# Patient Record
Sex: MALE | Race: WHITE | NOT HISPANIC OR LATINO | URBAN - NONMETROPOLITAN AREA
[De-identification: names, ages, dates, MRNs, and addresses within clinical notes are randomized per-mention and may not be internally consistent; named-entity substitution may affect disease eponyms.]

---

## 2024-12-04 ENCOUNTER — APPOINTMENT (OUTPATIENT)
Dept: PRIMARY CARE | Facility: CLINIC | Age: 59
End: 2024-12-04
Payer: COMMERCIAL

## 2024-12-04 VITALS
DIASTOLIC BLOOD PRESSURE: 92 MMHG | SYSTOLIC BLOOD PRESSURE: 147 MMHG | WEIGHT: 203 LBS | HEIGHT: 73 IN | OXYGEN SATURATION: 97 % | HEART RATE: 70 BPM | BODY MASS INDEX: 26.9 KG/M2 | RESPIRATION RATE: 18 BRPM

## 2024-12-04 DIAGNOSIS — H93.13 TINNITUS OF BOTH EARS: ICD-10-CM

## 2024-12-04 DIAGNOSIS — M25.561 ACUTE PAIN OF RIGHT KNEE: ICD-10-CM

## 2024-12-04 DIAGNOSIS — I10 HYPERTENSION, UNSPECIFIED TYPE: ICD-10-CM

## 2024-12-04 DIAGNOSIS — Z12.5 PROSTATE CANCER SCREENING: ICD-10-CM

## 2024-12-04 DIAGNOSIS — M10.9 GOUT, UNSPECIFIED CAUSE, UNSPECIFIED CHRONICITY, UNSPECIFIED SITE: ICD-10-CM

## 2024-12-04 PROCEDURE — 3080F DIAST BP >= 90 MM HG: CPT | Performed by: INTERNAL MEDICINE

## 2024-12-04 PROCEDURE — 3008F BODY MASS INDEX DOCD: CPT | Performed by: INTERNAL MEDICINE

## 2024-12-04 PROCEDURE — 99396 PREV VISIT EST AGE 40-64: CPT | Performed by: INTERNAL MEDICINE

## 2024-12-04 PROCEDURE — 3077F SYST BP >= 140 MM HG: CPT | Performed by: INTERNAL MEDICINE

## 2024-12-04 RX ORDER — MELOXICAM 15 MG/1
15 TABLET ORAL DAILY
Qty: 10 TABLET | Refills: 0 | Status: SHIPPED | OUTPATIENT
Start: 2024-12-04 | End: 2024-12-14

## 2024-12-04 RX ORDER — AMITRIPTYLINE HYDROCHLORIDE 25 MG/1
25 TABLET, FILM COATED ORAL NIGHTLY PRN
COMMUNITY
Start: 2020-10-22

## 2024-12-04 RX ORDER — PREDNISONE 20 MG/1
40 TABLET ORAL DAILY
Qty: 10 TABLET | Refills: 0 | Status: SHIPPED | OUTPATIENT
Start: 2024-12-04 | End: 2024-12-09

## 2024-12-04 ASSESSMENT — ENCOUNTER SYMPTOMS
CARDIOVASCULAR NEGATIVE: 1
RESPIRATORY NEGATIVE: 1
PSYCHIATRIC NEGATIVE: 1
GASTROINTESTINAL NEGATIVE: 1
CONSTITUTIONAL NEGATIVE: 1
BACK PAIN: 1
NEUROLOGICAL NEGATIVE: 1

## 2024-12-04 ASSESSMENT — PAIN SCALES - GENERAL: PAINLEVEL_OUTOF10: 7

## 2024-12-04 NOTE — PROGRESS NOTES
"Patient ID: He states that his right knee has been hurting more. He states that for the past 4-5 nights, he has not been able to sleep due to pain. He states it as swollen, but that has improved. 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 He states his back pain is not any worse. He denies any pain in left knee, shoulders. Denies CP, Cough or SOB. Denies any urinary symptoms. He states he has always \"been forgetful\". He states that he is always active, states he still works, but for himself. He denies smoking cigarettes. He denies any neck pain. He states he used to \"always have pain in his big toe\", but that has resolved. He states he took Allopurinol for years, quit taking over a year ago. He states the pain in his knee is new, used to only be in his big toe. He states he thinks he was diagnosed with RA in the past. He states that he drinks at least three times per week, gets \"drunk but doesn't count how many\". He states he loves crab meat and shrimp but avoid it now due to gout. He denies any issues with his stomach. He states he always has \"ringing in his ears\", has just \"gotten used to it\" and \"puts it out of his mind\". States tinnitus started after he \"hit his head on the ground years ago and it started after that\". He states that ENT saw him for it years ago, and started him on sleeping pill but he rarely takes it, states he was told he had to get hearing aids to get rid of     HEALTH MAINTENANCE: Annual Wellness Physical  Last Office Visit: 6/14/22 with MARY ANNE Emerson  Last Labs: 9/25/2020  PSA Screening (starting at age 55 till 69): could not locate  Colonoscopy (45-75 or age 40 with 1st degree relative dx colon ca): 2015  Lung cancer screening (50-78 y/o x 20 pk yr for at least 20 yrs + current smoker OR quit in last 15 years, no CT w/I last year): na    HPI Sonny Robles is a 59 y.o. male with PMH remarkable " "for polyarthralgia, Gout, HTN, finger fx, tinnitus who presents to the office today for physical.     Past Medical History:   Diagnosis Date    Acute upper respiratory infection, unspecified 08/30/2019    Acute URI    Multiple fractures of ribs, right side, subsequent encounter for fracture with routine healing 10/02/2020    Closed fracture of multiple ribs of right side with routine healing, subsequent encounter    Personal history of other diseases of the musculoskeletal system and connective tissue     History of gout      Past Surgical History:   Procedure Laterality Date    BACK SURGERY  08/13/2015    Back Surgery    KNEE SURGERY  08/13/2015    Knee Surgery    OTHER SURGICAL HISTORY  10/19/2015    Leg Repair         No family history on file.     There is no immunization history on file for this patient.    Review of Systems   Constitutional: Negative.    HENT: Negative.     Respiratory: Negative.     Cardiovascular: Negative.    Gastrointestinal: Negative.    Genitourinary: Negative.    Musculoskeletal:  Positive for back pain.        + right knee pain   Skin: Negative.    Neurological: Negative.    Psychiatric/Behavioral: Negative.         Vitals:    12/04/24 1202   BP: (!) 147/92   BP Location: Left arm   Patient Position: Sitting   Pulse: 70   Resp: 18   SpO2: 97%   Weight: 92.1 kg (203 lb)   Height: 1.854 m (6' 1\")      Physical Exam    Current Outpatient Medications on File Prior to Visit   Medication Sig Dispense Refill    amitriptyline (Elavil) 25 mg tablet Take 1 tablet (25 mg) by mouth as needed at bedtime.       Lab Results   Component Value Date    WBC 6.3 09/25/2020    HGB 14.4 09/25/2020    HCT 41.3 09/25/2020     (L) 09/25/2020    ALT 23 10/05/2018    AST 35 10/05/2018     09/25/2020    K 4.3 09/25/2020    CL 97 09/25/2020    CREATININE 0.6 09/25/2020    BUN 4 (L) 09/25/2020    CO2 21 (L) 09/25/2020     Problem List Items Addressed This Visit             ICD-10-CM    Hypertension I10 "     He states he has not taken his BP medications for over a year  Advised to Monitor BP at home 2-3 times per week and call us with results after 3 weeks           Relevant Orders    CBC and Auto Differential    Comprehensive metabolic panel    Tsh With Reflex To Free T4 If Abnormal    Lipid panel    Tinnitus of both ears H93.13    Relevant Orders    Referral to ENT    Acute pain of right knee M25.561    Relevant Medications    predniSONE (Deltasone) 20 mg tablet    meloxicam (Mobic) 15 mg tablet    Other Relevant Orders    Uric acid    Rheumatoid factor    Citrulline Antibody, IgG    Gout M10.9    Relevant Medications    predniSONE (Deltasone) 20 mg tablet    meloxicam (Mobic) 15 mg tablet    Other Relevant Orders    Uric acid    Rheumatoid factor    Citrulline Antibody, IgG     Other Visit Diagnoses         Codes    Prostate cancer screening     Z12.5    Relevant Orders    Prostate Spec.Ag,Screen          --------------------  Written by Angélica Villalta RN, acting as a scribe for Dr. Ortega. This note accurately reflects the work and decisions made by Dr. Ortega.     I, Dr. Ortega, attest all medical record entries made by the scribe were under my direction and were personally dictated by me. I have reviewed the chart and agree that the record accurately reflects my performance of the history, physical exam, and assessment and plan.

## 2024-12-04 NOTE — PROGRESS NOTES
Subjective   Patient ID:   Sonny Robles is a 59 y.o. male who presents for Annual Exam and Knee Pain (Right knee over a week throbbing pain).  HPI  Pain scale: 7:    Living will? No  POA? No  Are you currently or have you recently been threatened or abused? No  Do you feel unsafe going back to the place you are living? No  Reported health: Good  Dental visits? No  Hearing problems? No  Vision problems? No  Healthy diet? No  Exercise? No exercise  Adequate fluid intake? Yes    Social History     Tobacco Use    Smoking status: Never    Smokeless tobacco: Never   Substance Use Topics    Alcohol use: Yes     Comment: 6-12 beers about 3 times a week    Drug use: Yes     Types: Marijuana     Comment: not regularly       Immunization History   Administered Date(s) Administered    Pf[employer] Purple Cap SARS-CoV-2 08/26/2021, 09/27/2021       Review of Systems  12 point review of systems negative unless stated above in HPI    There were no vitals filed for this visit.    Physical Exam  General: Alert and oriented, well nourished, no acute distress.  Lungs: Clear to auscultation, non-labored respiration.  Heart: Normal rate, regular rhythm, no murmur, gallop or edema.  Neurologic: Awake, alert, and oriented X3, CN II-XII intact.  Psychiatric: Cooperative, appropriate mood and affect.    Assessment/Plan

## 2024-12-05 NOTE — ASSESSMENT & PLAN NOTE
He states he has not taken his BP medications for over a year  Advised to Monitor BP at home 2-3 times per week and call us with results after 3 weeks

## 2024-12-05 NOTE — PATIENT INSTRUCTIONS
It was great to see you in the office today! Here is what we discussed at your visit today:    Please continue to take your current medications

## 2024-12-06 ENCOUNTER — LAB (OUTPATIENT)
Dept: LAB | Facility: LAB | Age: 59
End: 2024-12-06
Payer: COMMERCIAL

## 2024-12-06 DIAGNOSIS — Z12.5 PROSTATE CANCER SCREENING: ICD-10-CM

## 2024-12-06 DIAGNOSIS — M25.561 ACUTE PAIN OF RIGHT KNEE: ICD-10-CM

## 2024-12-06 DIAGNOSIS — I10 HYPERTENSION, UNSPECIFIED TYPE: ICD-10-CM

## 2024-12-06 DIAGNOSIS — M10.9 GOUT, UNSPECIFIED CAUSE, UNSPECIFIED CHRONICITY, UNSPECIFIED SITE: ICD-10-CM

## 2024-12-06 LAB
ALBUMIN SERPL BCP-MCNC: 3.8 G/DL (ref 3.4–5)
ALP SERPL-CCNC: 68 U/L (ref 33–136)
ALT SERPL W P-5'-P-CCNC: 10 U/L (ref 10–52)
ANION GAP SERPL CALC-SCNC: 9 MMOL/L (ref 10–20)
AST SERPL W P-5'-P-CCNC: 17 U/L (ref 9–39)
BASOPHILS # BLD AUTO: 0.03 X10*3/UL (ref 0–0.1)
BASOPHILS NFR BLD AUTO: 0.7 %
BILIRUB SERPL-MCNC: 1.3 MG/DL (ref 0–1.2)
BUN SERPL-MCNC: 13 MG/DL (ref 6–23)
CALCIUM SERPL-MCNC: 8.8 MG/DL (ref 8.6–10.3)
CHLORIDE SERPL-SCNC: 107 MMOL/L (ref 98–107)
CHOLEST SERPL-MCNC: 210 MG/DL (ref 0–199)
CHOLESTEROL/HDL RATIO: 3.6
CO2 SERPL-SCNC: 29 MMOL/L (ref 21–32)
CREAT SERPL-MCNC: 0.62 MG/DL (ref 0.5–1.3)
EGFRCR SERPLBLD CKD-EPI 2021: >90 ML/MIN/1.73M*2
EOSINOPHIL # BLD AUTO: 0.09 X10*3/UL (ref 0–0.7)
EOSINOPHIL NFR BLD AUTO: 2 %
ERYTHROCYTE [DISTWIDTH] IN BLOOD BY AUTOMATED COUNT: 13.1 % (ref 11.5–14.5)
GLUCOSE SERPL-MCNC: 87 MG/DL (ref 74–99)
HCT VFR BLD AUTO: 43.5 % (ref 41–52)
HDLC SERPL-MCNC: 58.6 MG/DL
HGB BLD-MCNC: 14.2 G/DL (ref 13.5–17.5)
IMM GRANULOCYTES # BLD AUTO: 0.01 X10*3/UL (ref 0–0.7)
IMM GRANULOCYTES NFR BLD AUTO: 0.2 % (ref 0–0.9)
LDLC SERPL CALC-MCNC: 136 MG/DL
LYMPHOCYTES # BLD AUTO: 1.07 X10*3/UL (ref 1.2–4.8)
LYMPHOCYTES NFR BLD AUTO: 23.7 %
MCH RBC QN AUTO: 32.7 PG (ref 26–34)
MCHC RBC AUTO-ENTMCNC: 32.6 G/DL (ref 32–36)
MCV RBC AUTO: 100 FL (ref 80–100)
MONOCYTES # BLD AUTO: 0.54 X10*3/UL (ref 0.1–1)
MONOCYTES NFR BLD AUTO: 12 %
NEUTROPHILS # BLD AUTO: 2.77 X10*3/UL (ref 1.2–7.7)
NEUTROPHILS NFR BLD AUTO: 61.4 %
NON HDL CHOLESTEROL: 151 MG/DL (ref 0–149)
NRBC BLD-RTO: 0 /100 WBCS (ref 0–0)
PLATELET # BLD AUTO: 277 X10*3/UL (ref 150–450)
POTASSIUM SERPL-SCNC: 3.7 MMOL/L (ref 3.5–5.3)
PROT SERPL-MCNC: 6.8 G/DL (ref 6.4–8.2)
RBC # BLD AUTO: 4.34 X10*6/UL (ref 4.5–5.9)
SODIUM SERPL-SCNC: 141 MMOL/L (ref 136–145)
TRIGL SERPL-MCNC: 79 MG/DL (ref 0–149)
TSH SERPL-ACNC: 1.63 MIU/L (ref 0.44–3.98)
URATE SERPL-MCNC: 4.9 MG/DL (ref 4–7.5)
VLDL: 16 MG/DL (ref 0–40)
WBC # BLD AUTO: 4.5 X10*3/UL (ref 4.4–11.3)

## 2024-12-06 PROCEDURE — 84153 ASSAY OF PSA TOTAL: CPT

## 2024-12-06 PROCEDURE — 86431 RHEUMATOID FACTOR QUANT: CPT

## 2024-12-06 PROCEDURE — 86200 CCP ANTIBODY: CPT

## 2024-12-06 PROCEDURE — 36415 COLL VENOUS BLD VENIPUNCTURE: CPT

## 2024-12-07 LAB
CCP IGG SERPL-ACNC: <1 U/ML
PSA SERPL-MCNC: 8.95 NG/ML
RHEUMATOID FACT SER NEPH-ACNC: 24 IU/ML (ref 0–15)

## 2024-12-09 DIAGNOSIS — R76.8 RHEUMATOID FACTOR POSITIVE: ICD-10-CM

## 2024-12-09 DIAGNOSIS — E78.6 HYPOCHOLESTEREMIA: ICD-10-CM

## 2024-12-09 DIAGNOSIS — R97.20 ELEVATED PSA: Primary | ICD-10-CM

## 2024-12-09 RX ORDER — ROSUVASTATIN CALCIUM 10 MG/1
10 TABLET, COATED ORAL DAILY
Qty: 100 TABLET | Refills: 3 | Status: SHIPPED | OUTPATIENT
Start: 2024-12-09 | End: 2026-01-13

## 2025-02-04 ENCOUNTER — TELEPHONE (OUTPATIENT)
Dept: RHEUMATOLOGY | Facility: CLINIC | Age: 60
End: 2025-02-04
Payer: COMMERCIAL

## 2025-02-04 NOTE — TELEPHONE ENCOUNTER
I tried getting a hold of the pt again to switch his appointment to virtual because the provider will not be in person pt did not answer again and mail box is full.

## 2025-02-04 NOTE — TELEPHONE ENCOUNTER
I tried calling the pt to let him know that we have to switch his appointment to virtual. Pt did not answer and his mailbox is full. I sent an sms notification through his inbox.

## 2025-02-05 ENCOUNTER — APPOINTMENT (OUTPATIENT)
Facility: CLINIC | Age: 60
End: 2025-02-05
Payer: COMMERCIAL

## 2025-02-13 ENCOUNTER — APPOINTMENT (OUTPATIENT)
Dept: UROLOGY | Facility: CLINIC | Age: 60
End: 2025-02-13
Payer: COMMERCIAL

## 2025-02-26 ENCOUNTER — HOSPITAL ENCOUNTER (OUTPATIENT)
Dept: RADIOLOGY | Facility: HOSPITAL | Age: 60
Discharge: HOME | End: 2025-02-26
Payer: COMMERCIAL

## 2025-02-26 ENCOUNTER — APPOINTMENT (OUTPATIENT)
Facility: CLINIC | Age: 60
End: 2025-02-26
Payer: COMMERCIAL

## 2025-02-26 VITALS
BODY MASS INDEX: 27.89 KG/M2 | HEART RATE: 66 BPM | DIASTOLIC BLOOD PRESSURE: 91 MMHG | SYSTOLIC BLOOD PRESSURE: 142 MMHG | OXYGEN SATURATION: 98 % | WEIGHT: 211.4 LBS

## 2025-02-26 DIAGNOSIS — M25.50 POLYARTHRALGIA: ICD-10-CM

## 2025-02-26 DIAGNOSIS — L40.9 SCALP PSORIASIS: ICD-10-CM

## 2025-02-26 DIAGNOSIS — L60.8 NAIL PITTING: ICD-10-CM

## 2025-02-26 DIAGNOSIS — M25.50 POLYARTHRALGIA: Primary | ICD-10-CM

## 2025-02-26 PROCEDURE — 1036F TOBACCO NON-USER: CPT | Performed by: INTERNAL MEDICINE

## 2025-02-26 PROCEDURE — 3077F SYST BP >= 140 MM HG: CPT | Performed by: INTERNAL MEDICINE

## 2025-02-26 PROCEDURE — 72110 X-RAY EXAM L-2 SPINE 4/>VWS: CPT

## 2025-02-26 PROCEDURE — 99205 OFFICE O/P NEW HI 60 MIN: CPT | Performed by: INTERNAL MEDICINE

## 2025-02-26 PROCEDURE — 73630 X-RAY EXAM OF FOOT: CPT | Mod: 50

## 2025-02-26 PROCEDURE — 3080F DIAST BP >= 90 MM HG: CPT | Performed by: INTERNAL MEDICINE

## 2025-02-26 PROCEDURE — 73130 X-RAY EXAM OF HAND: CPT | Mod: 50

## 2025-02-26 PROCEDURE — 73562 X-RAY EXAM OF KNEE 3: CPT | Mod: 50

## 2025-02-26 ASSESSMENT — ENCOUNTER SYMPTOMS
DEPRESSION: 0
LOSS OF SENSATION IN FEET: 0
OCCASIONAL FEELINGS OF UNSTEADINESS: 0

## 2025-02-26 ASSESSMENT — PATIENT HEALTH QUESTIONNAIRE - PHQ9
2. FEELING DOWN, DEPRESSED OR HOPELESS: NOT AT ALL
SUM OF ALL RESPONSES TO PHQ9 QUESTIONS 1 AND 2: 0
1. LITTLE INTEREST OR PLEASURE IN DOING THINGS: NOT AT ALL

## 2025-02-26 ASSESSMENT — PAIN SCALES - GENERAL: PAINLEVEL_OUTOF10: 4

## 2025-02-26 NOTE — PATIENT INSTRUCTIONS
Please get labs done and X rays done today    Also please get the ultrasound done of your hands and wrists before I see you back in clinic     Talk to your PCP about disability

## 2025-02-26 NOTE — PROGRESS NOTES
"Subjective   Patient ID: [id number]   Sonny Robles is a 60 y.o. male who presents for + RF.     HPI  Patient reports pain everywhere, wrists, wosre with activity, improves with rst but is also episodic. B/l ankles wsith activity and improves with rest.   Pain in knees worse with steps and improves with rest.   chronic lower back pain for 20 years.   Reports that he has pain in PIPs at rest and is constant, no diurnal variation.     He has a history of typical gout but hasn't had any recurrences for years. 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 He also had a left small finger proximal phalanx fracture     He was diagnosed with an infectious disease and he was paralized.     Constitutional: Denies fever, chills, weight loss. + weight gain  Eyes: Denies dry eyes, blurry vision, redness or pain or photophobia  ENT: Denies dry mouth, dental loss, loss of taste, nasal or oral ulcers, difficulty swallowing  Cardiovascular: Denies chest pain  Respiratory: Denies shortness of breath, cough, asthma, or recurrent respiratory infections  Gastrointestinal: Denies nausea, vomiting, heartburn, abdominal pain, constipation, diarrhea, melena or hematochezia. + hemorrhoids  Genitourinary: No recurrent urinary infections or STDs, no genital or anal ulcers.  Integumentary: Denies photosensitivity, rash or lesions, Raynaud's phenomenon, psoriatic lesions. Reports history of dandruff and erythematous patches on scalp.   Neurological: Denies any numbness or tingling  Hematologic/Lymphatic: Denies  history of clots (arterial or venous)  MSK: as above    PMH/PSH: history of HTN, gout,   Social: Nonsmoker, 6 packs beer every other day, smokes marijuana very seldom.   FHx: No family history of autoimmune diseases "       Patient Active Problem List   Diagnosis    Hypertension    Tinnitus of both ears    Acute pain of right knee    Gout        Past Medical History:   Diagnosis Date    Acute upper respiratory infection, unspecified 08/30/2019    Acute URI    Multiple fractures of ribs, right side, subsequent encounter for fracture with routine healing 10/02/2020    Closed fracture of multiple ribs of right side with routine healing, subsequent encounter    Personal history of other diseases of the musculoskeletal system and connective tissue     History of gout        Past Surgical History:   Procedure Laterality Date    BACK SURGERY  08/13/2015    Back Surgery    KNEE SURGERY  08/13/2015    Knee Surgery    OTHER SURGICAL HISTORY  10/19/2015    Leg Repair        Social History     Socioeconomic History    Marital status: Single     Spouse name: Not on file    Number of children: Not on file    Years of education: Not on file    Highest education level: Not on file   Occupational History    Not on file   Tobacco Use    Smoking status: Never    Smokeless tobacco: Never   Substance and Sexual Activity    Alcohol use: Yes     Comment: 6-12 beers about 3 times a week    Drug use: Yes     Types: Marijuana     Comment: not regularly    Sexual activity: Not on file   Other Topics Concern    Not on file   Social History Narrative    Not on file     Social Drivers of Health     Financial Resource Strain: Not on file   Food Insecurity: Not on file   Transportation Needs: Not on file   Physical Activity: Not on file   Stress: Not on file   Social Connections: Not on file   Intimate Partner Violence: Not on file   Housing Stability: Not on file        Allergies   Allergen Reactions    Shellfish Derived Hives          Current Outpatient Medications:     amitriptyline (Elavil) 25 mg tablet, Take 1 tablet (25 mg) by mouth as needed at bedtime., Disp: , Rfl:     rosuvastatin (Crestor) 10 mg tablet, Take 1 tablet (10 mg) by mouth once daily.,  "Disp: 100 tablet, Rfl: 3       Objective     Visit Vitals  BP (!) 142/91 (BP Location: Left arm, Patient Position: Sitting)   Pulse 66      Physical Exam  General: AAOx3, Cooperative  Head: normocephalic, atraumatic  Eyes: EOMI, conjunctiva clear, sclera white, anicteric  Neck/Lymph: FROM, trachea midline  Neuro: CN II-XII grossly intact, no focal deficit  Skin: Scalp with what seems to be PsO  MSK: Upper Extremities: Has ttp of PIPs, elbows.   Hand/Fingers: No erythema, swelling, or warmth at DIP, PIP, or MCP joints, FROM grossly. + francia's and heberden's. + Grind test  Wrists: No erythema, swelling, warmth or tenderness at wrist, FROM grossly  Elbows: No tenderness, swelling, erythema or warmth at elbows, FROM grossly. No nodules   Shoulders:  FROM  Lower Extremities:   Hips: No obvious deformities.   Knees: No tenderness, deformities, swelling, rashes, or warmth. + crepitus, no pes anserine bursa TTP   Ankles: No deformities, tenderness, edema, erythema, ulceration, or warmth at the ankle  Feet: Negative MTP squeeze. Normal ROM grossly.      Lab Results   Component Value Date    WBC 4.5 12/06/2024    HGB 14.2 12/06/2024    HCT 43.5 12/06/2024     12/06/2024     12/06/2024        Chemistry    Lab Results   Component Value Date/Time     12/06/2024 1312    K 3.7 12/06/2024 1312     12/06/2024 1312    CO2 29 12/06/2024 1312    BUN 13 12/06/2024 1312    CREATININE 0.62 12/06/2024 1312    Lab Results   Component Value Date/Time    CALCIUM 8.8 12/06/2024 1312    ALKPHOS 68 12/06/2024 1312    AST 17 12/06/2024 1312    ALT 10 12/06/2024 1312    BILITOT 1.3 (H) 12/06/2024 1312           No results found for: \"CRP\"   Lab Results   Component Value Date    MJ NEGATIVE 10/05/2018    RF 24 (H) 12/06/2024    SEDRATE 3 10/05/2018      No results found for: \"CKTOTAL\"  Lab Results   Component Value Date    NEUTROABS 2.77 12/06/2024      No results found for: \"FERRITIN\"   No results found for: " "\"HEPATOT\", \"HEPAIGM\", \"HEPBCIGM\", \"HEPBCAB\", \"HBEAG\", \"HEPCAB\"   Lab Results   Component Value Date    ALT 10 12/06/2024    AST 17 12/06/2024    ALKPHOS 68 12/06/2024    BILITOT 1.3 (H) 12/06/2024      No results found for: \"PPD\"   Lab Results   Component Value Date    URICACID 4.9 12/06/2024      Lab Results   Component Value Date    CALCIUM 8.8 12/06/2024      No results found for: \"SPEP\", \"UPEP\"   No results found for: \"ALBUR\", \"[id number]\"     === 10/29/20 ===  FINDINGS:  Left 5th digit, five views.  Interval removal overlying casting material.     Interval callus formation is seen involving a previously identified  5th proximal phalanx fracture. Overall unchanged alignment.     There is continues to be impaction with volar displacement of the  distal fracture fragment at the fracture site with volar angulation  of the fracture apex.  No acute soft tissue abnormality is visualized     There is unchanged severe radiocarpal degenerative changes.  Moderate 1st carpometacarpal joint osteoarthrosis is unchanged  compared to prior imaging     Assessment/Plan    A 60 year old M here for evaluation of polyarthralgias.   He has a previous diagnosis of gout, no recent flares, not on any ULT.     Description is mechanical but also has Scalp PsO and nail pitting changes on his toe nails.     No evidence of inflammatory arthritis today, enthesitis, uveitis or IBD.     Labs from 12/6/2024 reviewed   Low titer RF, CCP neg   Uric acid <6  CBC and CMP reviewed    - Labs today  - XR today  - MSK US of hands and wrists to evaluate for chondrocalcinosis, inflammatory arthritis vs OA.   - Refer to dermatology for scalp PsO and for toe nail pitting.     RTC in 1 month for discussion of results     Jd Dunham MD  Division of Rheumatology   [facility]     "

## 2025-02-27 LAB
CRP SERPL-MCNC: 3.5 MG/L
ERYTHROCYTE [SEDIMENTATION RATE] IN BLOOD BY WESTERGREN METHOD: 17 MM/H
HLA-B27 QL NAA+PROBE: NORMAL
QUEST HLAB27 TYPING RESULTS REVIEWED BY:: NORMAL

## 2025-03-04 LAB
CRP SERPL-MCNC: 3.5 MG/L
ERYTHROCYTE [SEDIMENTATION RATE] IN BLOOD BY WESTERGREN METHOD: 17 MM/H
HLA-B27 QL NAA+PROBE: NEGATIVE
QUEST HLAB27 TYPING RESULTS REVIEWED BY:: NORMAL

## 2025-03-07 DIAGNOSIS — M25.50 POLYARTHRALGIA: Primary | ICD-10-CM

## 2025-03-07 DIAGNOSIS — L40.9 SCALP PSORIASIS: ICD-10-CM

## 2025-03-24 ENCOUNTER — APPOINTMENT (OUTPATIENT)
Dept: RADIOLOGY | Facility: HOSPITAL | Age: 60
End: 2025-03-24
Payer: COMMERCIAL

## 2025-03-24 ENCOUNTER — HOSPITAL ENCOUNTER (OUTPATIENT)
Dept: RADIOLOGY | Facility: HOSPITAL | Age: 60
Discharge: HOME | End: 2025-03-24
Payer: COMMERCIAL

## 2025-03-24 DIAGNOSIS — M25.50 POLYARTHRALGIA: ICD-10-CM

## 2025-03-24 PROCEDURE — 76881 US COMPL JOINT R-T W/IMG: CPT | Mod: RT

## 2025-03-24 PROCEDURE — 76882 US LMTD JT/FCL EVL NVASC XTR: CPT | Performed by: RADIOLOGY

## 2025-03-24 PROCEDURE — 76881 US COMPL JOINT R-T W/IMG: CPT

## 2025-03-25 ENCOUNTER — APPOINTMENT (OUTPATIENT)
Dept: RADIOLOGY | Facility: HOSPITAL | Age: 60
End: 2025-03-25
Payer: COMMERCIAL

## 2025-03-26 ENCOUNTER — APPOINTMENT (OUTPATIENT)
Facility: CLINIC | Age: 60
End: 2025-03-26
Payer: COMMERCIAL

## 2025-03-26 VITALS
BODY MASS INDEX: 27.07 KG/M2 | OXYGEN SATURATION: 98 % | HEART RATE: 61 BPM | DIASTOLIC BLOOD PRESSURE: 80 MMHG | WEIGHT: 205.2 LBS | SYSTOLIC BLOOD PRESSURE: 123 MMHG

## 2025-03-26 DIAGNOSIS — Z71.89 ENCOUNTER FOR MEDICATION COUNSELING: ICD-10-CM

## 2025-03-26 DIAGNOSIS — M25.50 POLYARTHRALGIA: Primary | ICD-10-CM

## 2025-03-26 DIAGNOSIS — M25.461 PAIN AND SWELLING OF RIGHT KNEE: ICD-10-CM

## 2025-03-26 DIAGNOSIS — M25.561 PAIN AND SWELLING OF RIGHT KNEE: ICD-10-CM

## 2025-03-26 PROCEDURE — 99215 OFFICE O/P EST HI 40 MIN: CPT | Performed by: INTERNAL MEDICINE

## 2025-03-26 PROCEDURE — 3079F DIAST BP 80-89 MM HG: CPT | Performed by: INTERNAL MEDICINE

## 2025-03-26 PROCEDURE — 3074F SYST BP LT 130 MM HG: CPT | Performed by: INTERNAL MEDICINE

## 2025-03-26 PROCEDURE — 20610 DRAIN/INJ JOINT/BURSA W/O US: CPT | Performed by: INTERNAL MEDICINE

## 2025-03-26 PROCEDURE — 1036F TOBACCO NON-USER: CPT | Performed by: INTERNAL MEDICINE

## 2025-03-26 RX ORDER — PREDNISONE 10 MG/1
TABLET ORAL
Qty: 55 TABLET | Refills: 0 | Status: SHIPPED | OUTPATIENT
Start: 2025-03-26 | End: 2025-05-09

## 2025-03-26 ASSESSMENT — PATIENT HEALTH QUESTIONNAIRE - PHQ9
2. FEELING DOWN, DEPRESSED OR HOPELESS: NOT AT ALL
1. LITTLE INTEREST OR PLEASURE IN DOING THINGS: NOT AT ALL
SUM OF ALL RESPONSES TO PHQ9 QUESTIONS 1 AND 2: 0

## 2025-03-26 ASSESSMENT — ENCOUNTER SYMPTOMS
LOSS OF SENSATION IN FEET: 0
DEPRESSION: 0
OCCASIONAL FEELINGS OF UNSTEADINESS: 0

## 2025-03-26 ASSESSMENT — PAIN SCALES - GENERAL: PAINLEVEL_OUTOF10: 5

## 2025-03-26 NOTE — PATIENT INSTRUCTIONS
Prednisone (Deltasone)  Prednisone (Deltasone) is part of a potent class of anti-inflammatory agents, known as corticosteroids, which are used to control inflammation of the joints and organs. It is often used to treat a variety of inflammatory symptoms, including redness, swelling and pain. Prednisone is used to treat conditions like rheumatoid arthritis, lupus, vasculitis, and many other inflammatory diseases.    How To Take It  Dosing of prednisone varies depending on the state of the disease being treated. Doses used in rheumatoid arthritis are commonly 5-10 mg daily, while doses needed in lupus and vasculitis are often 60-80 mg daily, or sometimes higher. The dose is usually decided based on your weight and disease manifestations. Prednisone usually achieves its effect within 1-2 hours. The delayed release tablets take effect about 6 hours after taking the dose. Prednisone stops working soon after stopping the medication. If you have been taking prednisone regularly for longer than 2 weeks, do not stop it suddenly because you could develop adrenal insufficiency. Instead, you should discuss a tapering schedule with your rheumatology provider.    Side Effects  Most side effects are related to the dose and duration, so the goal is to use it at the lowest effective dose for the shortest period necessary. Some potential side effects include easy bruising, osteoporosis (or weakened bones), diabetes, hypertension, weight gain, cataracts, glaucoma, and a bone disorder called avascular necrosis.    Although prednisone rarely has a direct interaction with other medications, there is an increased risk of infection when combining prednisone with other medications that affect your immune system. At higher doses, your provider may also prescribe you prophylactic medications to prevent pneumonia. Additionally, when taking prednisone with NSAIDs (such as naproxen or ibuprofen), there can be an increased risk of stomach  ulcers.    Tell Your Rheumatology Provider  Your doctor will monitor you for side effects. Be sure to discuss any new symptoms you are experiencing with your rheumatology provider.    If pregnant or if you are considering pregnancy, discuss this with your doctor before starting medication. Although prednisone can be necessary to use during pregnancy, complications can include still birth, and premature delivery. Babies born from women receiving large doses of corticosteroids during pregnancy can develop underactive adrenal glands and can be smaller than expected at birth. Babies can develop cleft lip and cleft palate as well. Although some of the drug passes into breast milk, prednisone appears to be safe while breastfeeding. After a dose of 20 mg of prednisone and higher, a 4-hour delay in breastfeeding is recommended.

## 2025-03-26 NOTE — PROGRESS NOTES
"Subjective   Patient ID: [id number]  Sonny Robles is a 60 y.o. male who presents for Follow-up.  HPI  PMH/PSH: history of HTN, gout,   Social: Nonsmoker, 6 packs beer every other day, smokes marijuana very seldom.   FHx: No family history of autoimmune diseases     First visit 2/26/2025  Patient reports pain everywhere, wrists, wosre with activity, improves with rst but is also episodic. B/l ankles wsith activity and improves with rest.   Pain in knees worse with steps and improves with rest.   chronic lower back pain for 20 years.   Reports that he has pain in PIPs at rest and is constant, no diurnal variation.      He has a history of typical gout but hasn't had any recurrences for years. 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 He also had a left small finger proximal phalanx fracture      He was diagnosed with an infectious disease and he was paralized.     Interval Hx:   Feels that the joint pains are moving, feels like the pain in his feet is moving to his knees now. Knees started hurting two nights ago, R knee> L knee, pain woke him up in the middle of the night, pain is worse at rest.   Gets also pain in MCPs and CMC, improves somewhat with activity but persistent.     Denies fever, chills, weight loss. + weight gain. Denies dry eyes, blurry vision, redness or pain or photophobia. Denies dry mouth, dental loss, loss of taste, nasal or oral ulcers, difficulty swallowing. Denies chest pain. Denies shortness of breath, cough, asthma, or recurrent respiratory infections. Denies nausea, vomiting, heartburn, abdominal pain, constipation, diarrhea, melena or hematochezia. + hemorrhoids. No recurrent urinary infections or STDs, no genital or anal ulcers. Denies photosensitivity, rash or lesions, " Raynaud's phenomenon,. Reports history of dandruff and erythematous patches on scalp and was told he has PsO. Denies any numbness or tingling. Denies  history of clots (arterial or venous)    Patient Active Problem List   Diagnosis    Hypertension    Tinnitus of both ears    Acute pain of right knee    Gout       Past Medical History:   Diagnosis Date    Acute upper respiratory infection, unspecified 08/30/2019    Acute URI    Multiple fractures of ribs, right side, subsequent encounter for fracture with routine healing 10/02/2020    Closed fracture of multiple ribs of right side with routine healing, subsequent encounter    Personal history of other diseases of the musculoskeletal system and connective tissue     History of gout       Past Surgical History:   Procedure Laterality Date    BACK SURGERY  08/13/2015    Back Surgery    KNEE SURGERY  08/13/2015    Knee Surgery    OTHER SURGICAL HISTORY  10/19/2015    Leg Repair       Social History     Socioeconomic History    Marital status: Single     Spouse name: Not on file    Number of children: Not on file    Years of education: Not on file    Highest education level: Not on file   Occupational History    Not on file   Tobacco Use    Smoking status: Never    Smokeless tobacco: Never   Substance and Sexual Activity    Alcohol use: Yes     Comment: 6-12 beers about 3 times a week    Drug use: Yes     Types: Marijuana     Comment: not regularly    Sexual activity: Not on file   Other Topics Concern    Not on file   Social History Narrative    Not on file     Social Drivers of Health     Financial Resource Strain: Not on file   Food Insecurity: Not on file   Transportation Needs: Not on file   Physical Activity: Not on file   Stress: Not on file   Social Connections: Not on file   Intimate Partner Violence: Not on file   Housing Stability: Not on file       Allergies   Allergen Reactions    Shellfish Derived Hives         Current Outpatient Medications:      amitriptyline (Elavil) 25 mg tablet, Take 1 tablet (25 mg) by mouth as needed at bedtime., Disp: , Rfl:     rosuvastatin (Crestor) 10 mg tablet, Take 1 tablet (10 mg) by mouth once daily., Disp: 100 tablet, Rfl: 3    Objective     Visit Vitals  /80 (BP Location: Left arm, Patient Position: Sitting)   Pulse 61       Physical Exam  Copied from previous with necessary adjustments   General: AAOx3, Cooperative  Head: normocephalic, atraumatic  Eyes: EOMI, conjunctiva clear, sclera white, anicteric  Neck/Lymph: FROM, trachea midline  Neuro: CN II-XII grossly intact, no focal deficit  Skin: Scalp with what seems to be PsO  MSK: Upper Extremities: Has ttp of PIPs, elbows.   Hand/Fingers: R third MCP with some swelling and ttp. FROM grossly. + francia's and heberden's. + Grind test  Wrists: L wrist ganglion cyst. L wrist with ttp but no erythema, swelling, warmth at wrist, FROM grossly  Elbows: No tenderness, swelling, erythema or warmth at elbows, FROM grossly. No nodules   Shoulders:  FROM  Lower Extremities:   Hips: No obvious deformities.   Knees: R knee with significant swelling, no warmth or erythema No tenderness, deformities, swelling, rashes, or warmth. + crepitus, no pes anserine bursa TTP   Ankles: No deformities, tenderness, edema, erythema, ulceration, or warmth at the ankle  Feet: Negative MTP squeeze. Normal ROM grossly.      Lab Results   Component Value Date    WBC 4.5 12/06/2024    HGB 14.2 12/06/2024    HCT 43.5 12/06/2024     12/06/2024     12/06/2024       Chemistry    Lab Results   Component Value Date/Time     12/06/2024 1312    K 3.7 12/06/2024 1312     12/06/2024 1312    CO2 29 12/06/2024 1312    BUN 13 12/06/2024 1312    CREATININE 0.62 12/06/2024 1312    Lab Results   Component Value Date/Time    CALCIUM 8.8 12/06/2024 1312    ALKPHOS 68 12/06/2024 1312    AST 17 12/06/2024 1312    ALT 10 12/06/2024 1312    BILITOT 1.3 (H) 12/06/2024 1312          Lab Results    Component Value Date    CRP 3.5 02/26/2025    MJ NEGATIVE 10/05/2018    CALCIUM 8.8 12/06/2024     Alkaline Phosphatase   Date Value Ref Range Status   12/06/2024 68 33 - 136 U/L Final     AST   Date Value Ref Range Status   12/06/2024 17 9 - 39 U/L Final     Urea Nitrogen   Date Value Ref Range Status   12/06/2024 13 6 - 23 mg/dL Final     Sodium   Date Value Ref Range Status   12/06/2024 141 136 - 145 mmol/L Final     Potassium   Date Value Ref Range Status   12/06/2024 3.7 3.5 - 5.3 mmol/L Final     Bicarbonate   Date Value Ref Range Status   12/06/2024 29 21 - 32 mmol/L Final     ALT   Date Value Ref Range Status   12/06/2024 10 10 - 52 U/L Final     Comment:     Patients treated with Sulfasalazine may generate falsely decreased results for ALT.       US MSK upper extremity joints tendons muscles, US MSK upper extremity joints tendons muscles  Narrative: Interpreted By:  Naun Howard,   STUDY:  US MSK UPPER EXTREMITY JOINTS TENDONS MUSCLES; 3/24/2025 3:37 pm;  3/24/2025 3:36 pm      INDICATION:  Signs/Symptoms:R hand for synovial screen. Has Scalp PsO, also has OA  changes on exam but nail pitting present. Please evaluate for  chondrocalcinosis; Signs/Symptoms:L wrist ultrasound synovial screen  and for chondrocalcinsosis. Positive rheumatoid factor.      COMPARISON:  Radiographs dated 02/26/2025      ACCESSION NUMBER(S):  [id number]; [id number]      ORDERING CLINICIAN:  BALDOMERO CLAUDIO      TECHNIQUE:  Multiplanar color and grayscale ultrasound of the bilateral hand and  wrist was performed according to a synovial screening protocol.      RIGHT:  No fluid is evident in the flexor or extensor tendon sheaths of the  visualized right hand and wrist. Marginal osteophyte formation is  seen at the 1st digit carpometacarpal joint with a small volume  effusion. No increased color signal intensity or synovial  proliferation is evident. Marginal osteophyte formation is seen of  the 1st digit metacarpophalangeal  and interphalangeal joint. There  are trace volume effusions at these joints without increased color  signal intensity evident. There is possibly some mild synovial  proliferation of the 1st digit metacarpophalangeal joint but not at  the interphalangeal joint. No significant degenerative change joint  effusion increased color signal intensity or synovial proliferation  is evident at the 2nd digit metacarpophalangeal joint or its  interphalangeal joints. Minimal marginal osteophyte formation is seen  of the distal interphalangeal joint of the 3rd digit of the right  hand without increased color signal intensity joint effusion or  synovial proliferation evident. No significant degenerative change  joint effusion increased color signal intensity or synovial  proliferation is evident at the 3rd metacarpophalangeal or proximal  interphalangeal joints. There is a trace volume effusion of the 4th  digit metacarpophalangeal joint without increased color signal  intensity or synovial proliferation evident. No significant  degenerative changes evident. No significant degenerative change,  joint effusion, synovial proliferation or increased color signal  intensity is evident of the interphalangeal joints of the 4th digit  or of the 5th digit metacarpophalangeal joint or interphalangeal  joints. There is a small volume radiocarpal joint effusion in the  right wrist with synovial proliferation without increased color  signal intensity evident with color or microvascular imaging. No  definitive echogenic foci are evident in the right wrist joint as  visualized.      LEFT:  No fluid is evident in the flexor or extensor tendon sheaths of the  visualized left hand and wrist. At the palmar radial side of the  radiocarpal joint, more radial than in the radial artery there is a  lobulated septated structure with increased through transmission. It  is predominantly anechoic with multiple internal specular reflectors.  There appears to  be a small tail communicate into the radiocarpal  joint. No internal blood flow is evident. Approximate dimensions are  2.1 x 0.6 x 1.4 cm. Marginal osteophytes are seen of the 1st digit  carpometacarpal joint without increased color signal intensity,  synovial proliferation, or synovial proliferation evident. There is a  trace volume 1st digit metacarpophalangeal joint effusion without  increased color signal intensity or synovial proliferation evident.  Micro osteophytes are seen of the margins of the joint. No  significant degenerative change, joint effusion, increased color  signal intensity or synovial proliferation is evident at the  interphalangeal joint of the 1st digit. No sonographically evident  degenerative change, increased color signal intensity synovial  proliferation or joint effusion is evident at the 2nd digit  metacarpophalangeal joint or its interphalangeal joints. There is a  small volume effusion of the 3rd and 4th digit metacarpophalangeal  joint without increased color signal intensity or synovial  proliferation evident. Micro osteophyte formation is seen of the  distal interphalangeal joints of the 3rd and 4th digit of the left  hand without joint effusion increased color signal intensity or  synovial proliferation evident. No significant degenerative change  joint effusion increased color signal intensity or synovial  proliferation is evident at the proximal interphalangeal joints of  the 3rd and 4th digit. No significant degenerative change joint  effusion increased color signal intensity or synovial proliferation  is evident at the 5th digit metacarpophalangeal joint or its  interphalangeal joints. There is a small volume radiocarpal joint  effusion with synovial proliferation without increased color signal  intensity evident. There is question of some echogenic foci in the  radiocarpal joint although not well assessed.      Impression: 1. Polyarticular degenerative change of the  bilateral hand and wrist  with osteoarthrosis seen at some of the joints with note that there  are radiocarpal joint effusions with synovial proliferation without  increased color signal intensity bilaterally. Densities in the left  radiocarpal joint could represent intracapsular osteochondral bodies  although foci of chondrocalcinosis might be a differential  consideration. There are joint effusions at some metacarpophalangeal  joints bilaterally and the right 1st digit interphalangeal joint with  some synovial proliferation seen in the 1st digit metacarpophalangeal  joint of the right hand. Given that some of the joints have synovial  proliferation crystal and/or inflammatory arthropathy could be  present in addition to osteoarthrosis.  2. Findings most compatible with a complex ganglion cyst at the  palmar radial aspect of the left wrist at the level of the  radiocarpal joint.  3. Of note the wrists, particularly of the radiocarpal joints are not  well profiled on the recent prior radiographs and dedicated wrist  radiographs are recommended to better assess the wrist joints.      Signed by: Naun Howard 3/24/2025 4:23 PM  Dictation workstation:   [id number]      No echocardiogram results found for the past 12 months  No DXA results found for the past 12 months     Assessment/Plan   here for evaluation of polyarthralgias.   He has a previous diagnosis of gout, no recent flares, not on any ULT.     Labs from 12/6/2024 reviewed   Low titer RF, CCP neg   Uric acid <6  CBC and CMP reviewed    Labs from 2/26 reviewed:  ESR and CRP normal   HLA B27 neg    XR foot reviewed with what looks like gouty arthritis changes, however his uric acid Is <6mg/dL which is odd for erosive changes from gout, unless he had uncontrolled gout in the past that led to the erosive change and his gout has been more controlled recently. He does report previous recurrent episodes typical for gout.   He also has a low titer RF and was  diagnosed with scalp PsO and has nail pitting changes on his toe nails.     MSK US hands reviewed with polyarticular OA changes of b/l hands and wrists but also with b/l radiocarpal joint effusions with synovial proliferation, osteochondral bodies vs chondrocalc in the left radiocarpal joint?, joint effusions at MCPs bilaterally, synovial prolif of R 1st MCP and R 1st PIP with synovial prolif, complex ganglion cyst at palmar radial aspect of L wrist     Today he has significant swelling of his R knee that was successfully aspirated. He also has swelling of R 2nd MCP.     - synovial fluid cell count, culture, crystals  - XR wrist   - consider obtaining MRI of foot to evaluate whether he has any evidence of any active inflammatory arthritis and to evaluate for changes that would suggest RA/PsA>gout.   - Prednisone taper  - Will decide choice of DMARD after results of fluid studies and crystal analysis  - Referred to dermatology for scalp PsO and for toe nail pitting.     Patient ID: Sonny Robles is a 60 y.o. male.    Large Joint Injection/Arthrocentesis: R knee on 3/26/2025 4:33 PM  Details: 18 G needle, superolateral approach  Aspirate: yellow; sent for lab analysis  Outcome: tolerated well, no immediate complications  Consent was given by the patient.            RTC in 1 month      Jd Dunham MD  Division of Rheumatology   [facility]

## 2025-03-29 LAB
ANA SER QL IF: NORMAL
CRP SERPL-MCNC: NORMAL MG/L
ERYTHROCYTE [SEDIMENTATION RATE] IN BLOOD BY WESTERGREN METHOD: NORMAL MM/H
QUEST FLEXITEST1 RESULTS:: NORMAL
URATE SERPL-MCNC: NORMAL MG/DL

## 2025-04-09 ENCOUNTER — APPOINTMENT (OUTPATIENT)
Facility: CLINIC | Age: 60
End: 2025-04-09
Payer: COMMERCIAL

## 2025-04-30 ENCOUNTER — APPOINTMENT (OUTPATIENT)
Dept: UROLOGY | Facility: CLINIC | Age: 60
End: 2025-04-30
Payer: COMMERCIAL

## 2025-04-30 DIAGNOSIS — R97.20 ELEVATED PSA: Primary | ICD-10-CM

## 2025-04-30 PROCEDURE — 99203 OFFICE O/P NEW LOW 30 MIN: CPT | Performed by: SURGERY

## 2025-04-30 PROCEDURE — 1036F TOBACCO NON-USER: CPT | Performed by: SURGERY

## 2025-04-30 ASSESSMENT — PAIN SCALES - GENERAL: PAINLEVEL_OUTOF10: 0-NO PAIN

## 2025-04-30 NOTE — PROGRESS NOTES
Subjective   Patient ID: Sonny Robles is a 60 y.o. male who presents for Elevated PSA.    HPI  He was referred by his primary care.  His PSA was noted to be 8.9.  He does not recall having prior PSA test.  He has no voiding complaints.  His AUA symptom score is 0.  He denies any hematuria.  He has no family history of prostate cancer.  He does not smoke.  He denies any weight loss.          Review of Systems  As per HPI, remaining 10 systems negative            Objective   Physical Exam  Well nourished  Abdomen soft, ND, NT, mildly obese  No hernias  Circumcised, patent meatus, no lesions  Bilateral descended testes, no masses  GATO - prostate smooth, 40 grams, no nodules                  Assessment/Plan   Problem List Items Addressed This Visit    None  Visit Diagnoses         Codes      Elevated PSA    -  Primary R97.20    Relevant Orders    Prostate Specific Antigen             We discussed PSA and its role in prostate cancer screening.  We discussed ancillary testing including urine markers, MRI, and biopsy.  I will repeat his PSA today.  I will call him with the results.  If it remains elevated then I will proceed with getting an MRI.            Michelle Perez MD 04/30/25 11:17 AM